# Patient Record
Sex: MALE | URBAN - METROPOLITAN AREA
[De-identification: names, ages, dates, MRNs, and addresses within clinical notes are randomized per-mention and may not be internally consistent; named-entity substitution may affect disease eponyms.]

---

## 2021-07-05 ENCOUNTER — APPOINTMENT (OUTPATIENT)
Age: 61
Setting detail: DERMATOLOGY
End: 2021-07-05

## 2021-07-05 VITALS — HEIGHT: 68 IN | TEMPERATURE: 99 F | WEIGHT: 210 LBS

## 2021-07-05 DIAGNOSIS — L57.8 OTHER SKIN CHANGES DUE TO CHRONIC EXPOSURE TO NONIONIZING RADIATION: ICD-10-CM

## 2021-07-05 DIAGNOSIS — D22 MELANOCYTIC NEVI: ICD-10-CM

## 2021-07-05 DIAGNOSIS — L21.8 OTHER SEBORRHEIC DERMATITIS: ICD-10-CM

## 2021-07-05 PROBLEM — D22.5 MELANOCYTIC NEVI OF TRUNK: Status: ACTIVE | Noted: 2021-07-05

## 2021-07-05 PROBLEM — L30.9 DERMATITIS, UNSPECIFIED: Status: ACTIVE | Noted: 2021-07-05

## 2021-07-05 PROCEDURE — OTHER PRESCRIPTION: OTHER

## 2021-07-05 PROCEDURE — OTHER TREATMENT REGIMEN: OTHER

## 2021-07-05 PROCEDURE — OTHER MIPS QUALITY: OTHER

## 2021-07-05 PROCEDURE — OTHER SUNSCREEN RECOMMENDATIONS: OTHER

## 2021-07-05 PROCEDURE — OTHER REASSURANCE: OTHER

## 2021-07-05 PROCEDURE — OTHER FOLLOW UP FOR NEXT VISIT: OTHER

## 2021-07-05 PROCEDURE — OTHER COUNSELING: OTHER

## 2021-07-05 PROCEDURE — 99204 OFFICE O/P NEW MOD 45 MIN: CPT

## 2021-07-05 RX ORDER — CLOBETASOL PROPIONATE 0.5 MG/ML
THIN COAT SOLUTION TOPICAL QD
Qty: 1 | Refills: 1 | Status: ERX | COMMUNITY
Start: 2021-07-05

## 2021-07-05 ASSESSMENT — SEVERITY ASSESSMENT: HOW SEVERE IS THIS PATIENT'S CONDITION?: MODERATE

## 2021-07-05 ASSESSMENT — LOCATION SIMPLE DESCRIPTION DERM
LOCATION SIMPLE: LOWER BACK
LOCATION SIMPLE: POSTERIOR NECK
LOCATION SIMPLE: INFERIOR FOREHEAD
LOCATION SIMPLE: LEFT SCALP
LOCATION SIMPLE: ABDOMEN

## 2021-07-05 ASSESSMENT — LOCATION DETAILED DESCRIPTION DERM
LOCATION DETAILED: INFERIOR MID FOREHEAD
LOCATION DETAILED: SUPERIOR LUMBAR SPINE
LOCATION DETAILED: PERIUMBILICAL SKIN
LOCATION DETAILED: RIGHT POSTERIOR NECK
LOCATION DETAILED: LEFT MEDIAL FRONTAL SCALP

## 2021-07-05 ASSESSMENT — LOCATION ZONE DERM
LOCATION ZONE: NECK
LOCATION ZONE: SCALP
LOCATION ZONE: FACE
LOCATION ZONE: TRUNK

## 2021-07-05 NOTE — PROCEDURE: TREATMENT REGIMEN
Plan: This appears inflammatory in nature. The topical steroid should lead to resolution and the T gel shampoo should help keep it under control. He will call to follow up in 6 to 8 weeks if he is struggling to get this under control
Initiate Treatment: Clobetasol 0.05% solution
Otc Regimen: T gel
Detail Level: Simple

## 2021-07-05 NOTE — HPI: RASH (SEBORRHEIC DERMATITIS)
How Severe Is It?: moderate
Is This A New Presentation, Or A Follow-Up?: Rash
Additional History: Patient with patches of dry, red and very itchy skin on his neck and scalp for the past one month. He can’t think of any reason why this would have started. He has tried over-the-counter Nizoral shampoo with little benefit. No other skin surfaces are affected. He has no previous history of inflammation or psoriasis

## 2021-07-05 NOTE — PROCEDURE: REASSURANCE
Additional Note: Normal fbse, patient reassured no abnormal moles or suspicious lesions. Follow up if lesions change or become painful, otherwise follow up annually.
Hide Include Location In Plan Question?: No
Detail Level: Generalized
Include Location In Plan?: Yes
Additional Note: Normal fbse, patient counseled regarding sunscreen and how to use properly. Follow up if lesions change or become painful, otherwise follow up annually.

## 2021-07-05 NOTE — HPI: SKIN LESIONS
How Severe Is Your Skin Lesion?: mild
Have Your Skin Lesions Been Treated?: not been treated
Is This A New Presentation, Or A Follow-Up?: Skin Lesions
Additional History: Patient would like a good comprehensive skin exam. He does get some regular sun exposure but has no previous history of pre-cancer, skin cancer or melanoma